# Patient Record
Sex: MALE | Race: WHITE
[De-identification: names, ages, dates, MRNs, and addresses within clinical notes are randomized per-mention and may not be internally consistent; named-entity substitution may affect disease eponyms.]

---

## 2019-06-20 NOTE — PDOC1
History and Physical


Date of Admission


Date of Admission


DATE: 6/20/19 


TIME: 10:58





Identification/Chief Complaint


Chief Complaint


 seen in er, 43  year old  with history of high cholesterol, 

hypertension, not on any medications, smoking, who presents today complaining of

2/10 sharp substernal chest pain nonradiating in nature that began 2 days ago 

and has been going on intermittently since then, he states he has had episodes 

of shortness of breath and lightheadedness with the chest pain. Patient denies 

anything exacerbating or relieving the pain. Denies pain radiating





 woke up and felt like he was a  dizzy but not bad enough that he was going to 

pass out.





Past Medical History


Past Medical History


high cholesterol, hypertension





Family History


Family History:  High Cholestrol, Hypertension





Social History


Smoke:  <1 pack per day


ALCOHOL:  occassional


Drugs:  None





Current Problem List


Problem List


Problems


Medical Problems:


(1) Smoking addiction


Status: Acute  











Current Medications


Current Medications





Current Medications


Aspirin (Jose Aspirin) 325 mg 1X  ONCE PO  Last administered on 6/20/19at 

09:54;  Start 6/20/19 at 09:00;  Stop 6/20/19 at 09:06;  Status DC


Nitroglycerin (Nitrostat) 0.4 mg PRN Q5MIN  PRN SL CP RATING > 1/10 Last 

administered on 6/20/19at 09:55;  Start 6/20/19 at 09:00;  Stop 6/21/19 at 08:59


Morphine Sulfate (Morphine Sulfate) 2 mg PRN Q15MIN  PRN IV/SQ PAIN GREATER THAN

3/10 Last administered on 6/20/19at 09:56;  Start 6/20/19 at 09:00;  Stop 

6/21/19 at 08:59


Clonidine HCl (Catapres) 0.1 mg 1X  ONCE PO  Last administered on 6/20/19at 

10:18;  Start 6/20/19 at 09:45;  Stop 6/20/19 at 09:48;  Status DC


Labetalol HCl (Normodyne Iv Push) 10 mg 1X  ONCE IVP ;  Start 6/20/19 at 10:45; 

Stop 6/20/19 at 10:46;  Status DC


Hydralazine HCl (Apresoline Inj) 10 mg 1X  ONCE IVP ;  Start 6/20/19 at 11:00;  

Stop 6/20/19 at 11:01;  Status UNV





Allergies


Allergies:  


Coded Allergies:  


     Sulfa (Sulfonamide Antibiotics) (Verified  Adverse Reaction, Severe, "my 

kidneys shut down", 6/20/19)


     Tetanus Vaccines and Toxoid (Verified  Adverse Reaction, Intermediate, 

fever, 6/20/19)





ROS


Review of System





Review of Systems


Review of Systems





Constitutional: Denies fever or chills []


Eyes: Denies change in visual acuity, redness, or eye pain []


HENT: Denies nasal congestion or sore throat []


Respiratory: Denies cough or shortness of breath []


Cardiovascular: Reports chest pain, tingling left arm, not jaw


GI: Denies abdominal pain, nausea, vomiting, bloody stools or diarrhea []


: Denies dysuria or hematuria []


Musculoskeletal: Denies back pain or joint pain []


Integument: Denies rash or skin lesions []


Neurologic: Denies headache, focal weakness or sensory changes []








14 pt  systems were reviewed and found to be within normal limits, except as 

documented


PSYCHOLOGICAL ROS:  No: Anxiety, Behavioral Disorder, Concentration difficultie,

 Decreased libido, Depression, Disorientation, Hallucinations, Hostility, 

Irritablity, Memory difficulties, Mood Swings, Obsessive thoughts, Physical 

abuse, Sexual abuse, Sleep disturbances, Suicidal ideation, Other


Cardiovascular:  yes Chest Pain


Neurological:  Yes Dizziness





Physical Exam


Physical Exam





Physical Exam


Physical Exam





Constitutional: Well developed, well nourished, no acute distress, non-toxic 

appearance. []


HENT: Normocephalic, atraumatic, bilateral external ears normal, oropharynx 

moist, no oral exudates, nose normal. []


Eyes: PERRLA, EOMI, conjunctiva normal, no discharge. [] 


Neck: Normal range of motion, no tenderness, supple, no stridor. [] 


Cardiovascular:Heart rate regular rhythm, no murmur []


Lungs & Thorax:  Bilateral breath sounds clear to auscultation []


Abdomen: Bowel sounds normal, soft, no tenderness, no masses, no pulsatile 

masses. [] 


Skin: Warm, dry, no erythema, no rash. [] 


Back: No tenderness, no CVA tenderness. [] 


Extremities: No tenderness, no cyanosis, no clubbing, ROM intact, no edema. [] 


Neurologic: Alert and oriented X 3, normal motor function, normal sensory 

function, no focal deficits noted. []


Psychologic: Affect normal, judgement normal, mood normal. []


General:  Oriented X3, Cooperative, No acute distress


HEENT:  Atraumatic


Lungs:  Clear to auscultation


Heart:  RRR


Abdomen:  Normal bowel sounds, Soft, No tenderness


Rectal Exam:  not examined


Extremities:  No cyanosis


Neuro:  Normal speech, Strength at 5/5 X4 ext, Cranial nerves 3-12 NL


Psych/Mental Status:  Mental status NL, Mood NL





Vitals


Vitals





Vital Signs








  Date Time  Temp Pulse Resp B/P (MAP) Pulse Ox O2 Delivery O2 Flow Rate FiO2


 


6/20/19 10:55  90 16 182/107 (132) 97 Room Air  


 


6/20/19 08:52 98.1       





 98.1       











Labs


Labs





Laboratory Tests








Test


 6/20/19


09:45 6/20/19


09:50


 


Urine Collection Type Unknown  


 


Urine Color Yellow  


 


Urine Clarity Clear  


 


Urine pH 5.5  


 


Urine Specific Gravity 1.015  


 


Urine Protein


 Negative mg/dL


(NEG-TRACE) 





 


Urine Glucose (UA)


 Negative mg/dL


(NEG) 





 


Urine Ketones (Stick)


 Negative mg/dL


(NEG) 





 


Urine Blood Negative (NEG)  


 


Urine Nitrite Negative (NEG)  


 


Urine Bilirubin Negative (NEG)  


 


Urine Urobilinogen Dipstick


 0.2 mg/dL (0.2


mg/dL) 





 


Urine Leukocyte Esterase Negative (NEG)  


 


Urine RBC 0 /HPF (0-2)  


 


Urine WBC 0 /HPF (0-4)  


 


Urine Squamous Epithelial


Cells Few /LPF 


 





 


Urine Bacteria 0 /HPF (0-FEW)  


 


Urine Opiates Screen Neg (NEG)  


 


Urine Methadone Screen Neg (NEG)  


 


Urine Barbiturates Neg (NEG)  


 


Urine Phencyclidine Screen Neg (NEG)  


 


Urine


Amphetamine/Methamphetamine Neg (NEG) 


 





 


Urine Benzodiazepines Screen Neg (NEG)  


 


Urine Cocaine Screen Neg (NEG)  


 


Urine Cannabinoids Screen Neg (NEG)  


 


Urine Ethyl Alcohol Neg (NEG)  


 


White Blood Count


 


 6.7 x10^3/uL


(4.0-11.0)


 


Red Blood Count


 


 5.11 x10^6/uL


(4.30-5.70)


 


Hemoglobin


 


 17.0 g/dL


(13.0-17.5)


 


Hematocrit


 


 48.8 %


(39.0-53.0)


 


Mean Corpuscular Volume  95 fL () 


 


Mean Corpuscular Hemoglobin  33 pg (25-35) 


 


Mean Corpuscular Hemoglobin


Concent 


 35 g/dL


(31-37)


 


Red Cell Distribution Width


 


 13.5 %


(11.5-14.5)


 


Platelet Count


 


 189 x10^3/uL


(140-400)


 


Neutrophils (%) (Auto)  67 % (31-73) 


 


Lymphocytes (%) (Auto)  23 % (24-48) 


 


Monocytes (%) (Auto)  7 % (0-9) 


 


Eosinophils (%) (Auto)  2 % (0-3) 


 


Basophils (%) (Auto)  1 % (0-3) 


 


Neutrophils # (Auto)


 


 4.5 x10^3uL


(1.8-7.7)


 


Lymphocytes # (Auto)


 


 1.6 x10^3/uL


(1.0-4.8)


 


Monocytes # (Auto)


 


 0.5 x10^3/uL


(0.0-1.1)


 


Eosinophils # (Auto)


 


 0.1 x10^3/uL


(0.0-0.7)


 


Basophils # (Auto)


 


 0.0 x10^3/uL


(0.0-0.2)


 


Prothrombin Time


 


 12.4 SEC


(11.7-14.0)


 


Prothromb Time International


Ratio 


 1.0 (0.8-1.1) 





 


Activated Partial


Thromboplast Time 


 26 SEC (24-38) 





 


D-Dimer (Danielle)


 


 < 0.27


ug/mlFEU


 


Sodium Level


 


 139 mmol/L


(136-145)


 


Potassium Level


 


 4.6 mmol/L


(3.5-5.1)


 


Chloride Level


 


 102 mmol/L


()


 


Carbon Dioxide Level


 


 26 mmol/L


(21-32)


 


Anion Gap  11 (6-14) 


 


Blood Urea Nitrogen


 


 17 mg/dL


(8-26)


 


Creatinine


 


 1.1 mg/dL


(0.7-1.3)


 


Estimated GFR


(Cockcroft-Gault) 


 72.4 





 


Glucose Level


 


 121 mg/dL


(70-99)


 


Calcium Level


 


 9.9 mg/dL


(8.5-10.1)


 


Magnesium Level


 


 1.9 mg/dL


(1.8-2.4)


 


Creatine Kinase


 


 119 U/L


()


 


Creatine Kinase MB (Mass)


 


 1.4 ng/mL


(0.0-3.6)


 


Creatine Kinase MB Relative


Index 


 1.2 % (0-4) 





 


Troponin I Quantitative


 


 < 0.017 ng/mL


(0.000-0.055)


 


NT-Pro-B-Type Natriuretic


Peptide 


 14 pg/mL


(0-124)


 


Thyroid Stimulating Hormone


(TSH) 


 0.916 uIU/mL


(0.358-3.74)








Laboratory Tests








Test


 6/20/19


09:45 6/20/19


09:50


 


Urine Collection Type Unknown  


 


Urine Color Yellow  


 


Urine Clarity Clear  


 


Urine pH 5.5  


 


Urine Specific Gravity 1.015  


 


Urine Protein


 Negative mg/dL


(NEG-TRACE) 





 


Urine Glucose (UA)


 Negative mg/dL


(NEG) 





 


Urine Ketones (Stick)


 Negative mg/dL


(NEG) 





 


Urine Blood Negative (NEG)  


 


Urine Nitrite Negative (NEG)  


 


Urine Bilirubin Negative (NEG)  


 


Urine Urobilinogen Dipstick


 0.2 mg/dL (0.2


mg/dL) 





 


Urine Leukocyte Esterase Negative (NEG)  


 


Urine RBC 0 /HPF (0-2)  


 


Urine WBC 0 /HPF (0-4)  


 


Urine Squamous Epithelial


Cells Few /LPF 


 





 


Urine Bacteria 0 /HPF (0-FEW)  


 


Urine Opiates Screen Neg (NEG)  


 


Urine Methadone Screen Neg (NEG)  


 


Urine Barbiturates Neg (NEG)  


 


Urine Phencyclidine Screen Neg (NEG)  


 


Urine


Amphetamine/Methamphetamine Neg (NEG) 


 





 


Urine Benzodiazepines Screen Neg (NEG)  


 


Urine Cocaine Screen Neg (NEG)  


 


Urine Cannabinoids Screen Neg (NEG)  


 


Urine Ethyl Alcohol Neg (NEG)  


 


White Blood Count


 


 6.7 x10^3/uL


(4.0-11.0)


 


Red Blood Count


 


 5.11 x10^6/uL


(4.30-5.70)


 


Hemoglobin


 


 17.0 g/dL


(13.0-17.5)


 


Hematocrit


 


 48.8 %


(39.0-53.0)


 


Mean Corpuscular Volume  95 fL () 


 


Mean Corpuscular Hemoglobin  33 pg (25-35) 


 


Mean Corpuscular Hemoglobin


Concent 


 35 g/dL


(31-37)


 


Red Cell Distribution Width


 


 13.5 %


(11.5-14.5)


 


Platelet Count


 


 189 x10^3/uL


(140-400)


 


Neutrophils (%) (Auto)  67 % (31-73) 


 


Lymphocytes (%) (Auto)  23 % (24-48) 


 


Monocytes (%) (Auto)  7 % (0-9) 


 


Eosinophils (%) (Auto)  2 % (0-3) 


 


Basophils (%) (Auto)  1 % (0-3) 


 


Neutrophils # (Auto)


 


 4.5 x10^3uL


(1.8-7.7)


 


Lymphocytes # (Auto)


 


 1.6 x10^3/uL


(1.0-4.8)


 


Monocytes # (Auto)


 


 0.5 x10^3/uL


(0.0-1.1)


 


Eosinophils # (Auto)


 


 0.1 x10^3/uL


(0.0-0.7)


 


Basophils # (Auto)


 


 0.0 x10^3/uL


(0.0-0.2)


 


Prothrombin Time


 


 12.4 SEC


(11.7-14.0)


 


Prothromb Time International


Ratio 


 1.0 (0.8-1.1) 





 


Activated Partial


Thromboplast Time 


 26 SEC (24-38) 





 


D-Dimer (Danielle)


 


 < 0.27


ug/mlFEU


 


Sodium Level


 


 139 mmol/L


(136-145)


 


Potassium Level


 


 4.6 mmol/L


(3.5-5.1)


 


Chloride Level


 


 102 mmol/L


()


 


Carbon Dioxide Level


 


 26 mmol/L


(21-32)


 


Anion Gap  11 (6-14) 


 


Blood Urea Nitrogen


 


 17 mg/dL


(8-26)


 


Creatinine


 


 1.1 mg/dL


(0.7-1.3)


 


Estimated GFR


(Cockcroft-Gault) 


 72.4 





 


Glucose Level


 


 121 mg/dL


(70-99)


 


Calcium Level


 


 9.9 mg/dL


(8.5-10.1)


 


Magnesium Level


 


 1.9 mg/dL


(1.8-2.4)


 


Creatine Kinase


 


 119 U/L


()


 


Creatine Kinase MB (Mass)


 


 1.4 ng/mL


(0.0-3.6)


 


Creatine Kinase MB Relative


Index 


 1.2 % (0-4) 





 


Troponin I Quantitative


 


 < 0.017 ng/mL


(0.000-0.055)


 


NT-Pro-B-Type Natriuretic


Peptide 


 14 pg/mL


(0-124)


 


Thyroid Stimulating Hormone


(TSH) 


 0.916 uIU/mL


(0.358-3.74)











Images


Images





CT of the head without contrast, 6/20/2019:


 


HISTORY: Lightheadedness


 


The ventricles are within normal limits in size. There is no shift of the 


midline structures. There is no evidence of acute intracranial hemorrhage 


or mass effect.


 


IMPRESSION: No acute intracranial abnormality is detected.


 


 


 


RS Compliance Statement:


 


One or more of the following individualized dose reduction techniques were


utilized for this examination:


1. Automated exposure control


2. Adjustment of the mA and/or kV according to patient size


3. Use of iterative reconstruction technique


 


Electronically signed by: Rick Moritz, MD (6/20/2019 9:38 AM) [facility]











VTE Prophylaxis Ordered


VTE Prophylaxis Devices:  Yes


VTE Pharmacological Prophylaxi:  Yes





Assessment/Plan


Assessment/Plan


impression





1. chest pain, at high risk for CAD


2. tobacco abuse


3. uncontrolled htn


 











PLAN





ADMIT TO CVC


Cardiology consult


echo


lipids, trend troponin


Smoking cessation, 


DASH diet


lisinopril/chlorthalidone


prn iv hydralazine





74 min pt exam, chart review> 50% of time spent with exam, chart review, pt care

 coordination, disease education











QUEENIE MOONEY MD          Jun 20, 2019 10:58

## 2019-06-20 NOTE — CARD
MR#: [id number]

Account#: [id number]

Accession#: [id number]

Date of Study: 06/20/2019

Ordering Physician: PIPPA ARVIZU, 

Referring Physician: QUEENIE MOONEY, 

Tech: Evette Stallings





--------------- APPROVED REPORT --------------





EXAM: Two-dimensional and M-mode echocardiogram with Doppler and color Doppler.



Other Information 

Quality : AverageHR: 91bpm



INDICATION

Hypertension/HCVD



RISK FACTORS

Hyperlipidemia

Smoking 



2D DIMENSIONS 

RVDd2.3 (2.9-3.5cm)Left Atrium(2D)3.2 (1.6-4.0cm)

IVSd0.9 (0.7-1.1cm)Aortic Root(2D)3.8 (2.0-3.7cm)

LVDd4.9 (3.9-5.9cm)LVOT Diameter2.3 (1.8-2.4cm)

PWd1.1 (0.7-1.1cm)LVDs3.0 (2.5-4.0cm)

FS (%) 39.8 %SV80.7 ml

LVEF(%)70.2 (>50%)



Aortic Valve

AoV Peak Pro.123.2cm/sAoV VTI18.8cm

AO Peak GR.6.1mmHgLVOT Peak Pro.100.1cm/s

LVOT  VTI 16.35cmAO Mean GR.3mmHg

ANGELA (VMAX)2.14oo6NES   (VTI)3.70cm2



Mitral Valve

MV E Jkuzargb54.3cm/sMV DECEL [id number]

MV A Texeqwhb68.2cm/sMV OAK88zy

E/A  Ratio0.9MVA (PHT)4.16cm2



TDI

E/Lateral E'7.1E/Medial E'10.0



Pulmonary Valve

PV Peak Fkzzmavy022.6cm/sPV Peak Grad.6mmHg



Tricuspid Valve

RAP [id number]



Pulmonary Vein

S1 Pjbpyhqs08.5cm/sD2 Jxlvwltm41.9cm/s

PVa [id number]



 LEFT VENTRICLE 

The left ventricle is normal size. There is borderline concentric left ventricular hypertrophy. The l
eft ventricular systolic function is normal and the ejection fraction is within normal range. The Eje
ction Fraction is >55%. There is normal LV segmental wall motion. Transmitral Doppler flow pattern is
 Grade I-abnormal relaxation pattern.



 RIGHT VENTRICLE 

The right ventricle is normal size. There is normal right ventricular wall thickness. The right ventr
icular systolic function is normal.



 ATRIA 

The left atrium size is normal. The right atrium size is normal. Interatrial septal thickening is not
ed.



 AORTIC VALVE 

The aortic valve is normal in structure and function. Doppler and Color Flow revealed no significant 
aortic regurgitation. There is no significant aortic valvular stenosis.



 MITRAL VALVE 

The mitral valve is normal in structure and function. There is no evidence of mitral valve prolapse. 
There is no mitral valve stenosis. Doppler and Color Flow revealed no mitral valve regurgitation note
d.



 TRICUSPID VALVE 

The tricuspid valve is normal in structure and function. Doppler and Color Flow revealed trace tricus
pid regurgitation with an estimated PAP of 26 mmHg. There is no tricuspid valve stenosis.



 PULMONIC VALVE 

The pulmonic valve is not well visualized. Doppler and Color Flow revealed no pulmonic valvular regur
gitation.



 GREAT VESSELS 

The aortic root is normal in size. The IVC is normal in size and collapses >50% with inspiration.



 PERICARDIAL EFFUSION 

There is no evidence of significant pericardial effusion.



Critical Notification

Critical Value: No



<Conclusion>

The left ventricular systolic function is normal and the ejection fraction is within normal range. Th
e Ejection Fraction is >55%.

There is normal LV segmental wall motion.



Signed by : Robert Wetzel, 

Electronically Approved : 06/20/2019 16:12:12

## 2019-06-20 NOTE — EKG
[facility]

              [address] [place] [address]

Test Date:    2019               Test Time:    08:55:37

Pat Name:     AMANDA ARRINGTON          Department:   

Patient ID:   [id number]           Room:          

Gender:       M                        Technician:   

:          [id number]               Requested By: AURELIA DEXTER

Order Number: [id number]           Reading MD:     

                                 Measurements

Intervals                              Axis          

Rate:         96                       P:            49

TX:           152                      QRS:          80

QRSD:         88                       T:            66

QT:           348                                    

QTc:          446                                    

                           Interpretive Statements

SINUS RHYTHM

OTHERWISE NORMAL ECG

RI6.01          Unconfirmed report

No previous ECG available for comparison

## 2019-06-20 NOTE — RAD
CT of the head without contrast, 6/20/2019:

 

HISTORY: Lightheadedness

 

The ventricles are within normal limits in size. There is no shift of the 

midline structures. There is no evidence of acute intracranial hemorrhage 

or mass effect.

 

IMPRESSION: No acute intracranial abnormality is detected.

 

 

 

RS Compliance Statement:

 

One or more of the following individualized dose reduction techniques were

utilized for this examination:

1. Automated exposure control

2. Adjustment of the mA and/or kV according to patient size

3. Use of iterative reconstruction technique

 

Electronically signed by: Rick Moritz, MD (6/20/2019 9:38 AM) [facility]

## 2019-06-20 NOTE — PHYS DOC
Adult General


Chief Complaint


Chief Complaint:  CHEST PAIN





HPI


HPI





Patient is a 43  year old  with history of high cholesterol, 

hypertension, not on any medications, smoking, who presents today complaining of

2/10 sharp substernal chest pain nonradiating in nature that began 2 days ago 

and has been going on intermittently since then, he states he has had episodes 

of shortness of breath and lightheadedness with the chest pain. Patient denies 

anything exacerbating or relieving the pain. Denies pain radiating.  Patient 

denies taking anything specifically to relieve the pain. He states he was on his

way to see Dr. Goldman for the first time and decided to come to the ED to be 

evaluated.





Review of Systems


Review of Systems





Constitutional: Denies fever or chills []


Eyes: Denies change in visual acuity, redness, or eye pain []


HENT: Denies nasal congestion or sore throat []


Respiratory: Denies cough or shortness of breath []


Cardiovascular: Reports chest pain


GI: Denies abdominal pain, nausea, vomiting, bloody stools or diarrhea []


: Denies dysuria or hematuria []


Musculoskeletal: Denies back pain or joint pain []


Integument: Denies rash or skin lesions []


Neurologic: Denies headache, focal weakness or sensory changes []








All other systems were reviewed and found to be within normal limits, except as 

documented in this note.





Current Medications


Current Medications





Current Medications








 Medications


  (Trade)  Dose


 Ordered  Sig/Tasha  Start Time


 Stop Time Status Last Admin


Dose Admin


 


 Aspirin


  (Jose Aspirin)  325 mg  1X  ONCE  6/20/19 09:00


 6/20/19 09:06 DC 6/20/19 09:54


325 MG


 


 Clonidine HCl


  (Catapres)  0.1 mg  1X  ONCE  6/20/19 09:45


 6/20/19 09:48 DC 6/20/19 10:18


0.1 MG


 


 Hydralazine HCl


  (Apresoline Inj)  10 mg  1X  ONCE  6/20/19 11:00


 6/20/19 11:01 DC  





 


 Labetalol HCl


  (Normodyne Iv


 Push)  10 mg  1X  ONCE  6/20/19 10:45


 6/20/19 10:46 DC 6/20/19 10:59


10 MG


 


 Morphine Sulfate


  (Morphine


 Sulfate)  2 mg  PRN Q15MIN  PRN  6/20/19 09:00


 6/21/19 08:59  6/20/19 09:56


2 MG


 


 Nitroglycerin


  (Nitrostat)  0.4 mg  PRN Q5MIN  PRN  6/20/19 09:00


 6/21/19 08:59  6/20/19 09:55


0.4 MG











Allergies


Allergies





Allergies








Coded Allergies Type Severity Reaction Last Updated Verified


 


  Sulfa (Sulfonamide Antibiotics) Adverse Reaction Severe "my kidneys shut down"

 6/20/19 Yes


 


  Tetanus Vaccines and Toxoid Adverse Reaction Intermediate fever 6/20/19 Yes











Physical Exam


Physical Exam





Constitutional: Well developed, well nourished, no acute distress, non-toxic 

appearance. []


HENT: Normocephalic, atraumatic, bilateral external ears normal, oropharynx 

moist, no oral exudates, nose normal. []


Eyes: PERRLA, EOMI, conjunctiva normal, no discharge. [] 


Neck: Normal range of motion, no tenderness, supple, no stridor. [] 


Cardiovascular:Heart rate regular rhythm, no murmur []


Lungs & Thorax:  Bilateral breath sounds clear to auscultation []


Abdomen: Bowel sounds normal, soft, no tenderness, no masses, no pulsatile 

masses. [] 


Skin: Warm, dry, no erythema, no rash. [] 


Back: No tenderness, no CVA tenderness. [] 


Extremities: No tenderness, no cyanosis, no clubbing, ROM intact, no edema. [] 


Neurologic: Alert and oriented X 3, normal motor function, normal sensory 

function, no focal deficits noted. []


Psychologic: Affect normal, judgement normal, mood normal. []





Current Patient Data


Vital Signs





                                   Vital Signs








  Date Time  Temp Pulse Resp B/P (MAP) Pulse Ox O2 Delivery O2 Flow Rate FiO2


 


6/20/19 10:59  94  185/107    


 


6/20/19 10:55   16  97 Room Air  


 


6/20/19 08:52 98.1       





 98.1       








Lab Values





                                Laboratory Tests








Test


 6/20/19


09:45 6/20/19


09:50


 


Urine Collection Type Unknown   


 


Urine Color Yellow   


 


Urine Clarity Clear   


 


Urine pH 5.5   


 


Urine Specific Gravity 1.015   


 


Urine Protein


 Negative mg/dL


(NEG-TRACE) 





 


Urine Glucose (UA)


 Negative mg/dL


(NEG) 





 


Urine Ketones (Stick)


 Negative mg/dL


(NEG) 





 


Urine Blood


 Negative (NEG)


 





 


Urine Nitrite


 Negative (NEG)


 





 


Urine Bilirubin


 Negative (NEG)


 





 


Urine Urobilinogen Dipstick


 0.2 mg/dL (0.2


mg/dL) 





 


Urine Leukocyte Esterase


 Negative (NEG)


 





 


Urine RBC 0 /HPF (0-2)   


 


Urine WBC 0 /HPF (0-4)   


 


Urine Squamous Epithelial


Cells Few /LPF  


 





 


Urine Bacteria


 0 /HPF (0-FEW)


 





 


Urine Opiates Screen Neg (NEG)   


 


Urine Methadone Screen Neg (NEG)   


 


Urine Barbiturates Neg (NEG)   


 


Urine Phencyclidine Screen Neg (NEG)   


 


Urine


Amphetamine/Methamphetamine Neg (NEG)  


 





 


Urine Benzodiazepines Screen Neg (NEG)   


 


Urine Cocaine Screen Neg (NEG)   


 


Urine Cannabinoids Screen Neg (NEG)   


 


Urine Ethyl Alcohol Neg (NEG)   


 


White Blood Count


 


 6.7 x10^3/uL


(4.0-11.0)


 


Red Blood Count


 


 5.11 x10^6/uL


(4.30-5.70)


 


Hemoglobin


 


 17.0 g/dL


(13.0-17.5)


 


Hematocrit


 


 48.8 %


(39.0-53.0)


 


Mean Corpuscular Volume


 


 95 fL ()





 


Mean Corpuscular Hemoglobin  33 pg (25-35)  


 


Mean Corpuscular Hemoglobin


Concent 


 35 g/dL


(31-37)


 


Red Cell Distribution Width


 


 13.5 %


(11.5-14.5)


 


Platelet Count


 


 189 x10^3/uL


(140-400)


 


Neutrophils (%) (Auto)  67 % (31-73)  


 


Lymphocytes (%) (Auto)  23 % (24-48)  L


 


Monocytes (%) (Auto)  7 % (0-9)  


 


Eosinophils (%) (Auto)  2 % (0-3)  


 


Basophils (%) (Auto)  1 % (0-3)  


 


Neutrophils # (Auto)


 


 4.5 x10^3uL


(1.8-7.7)


 


Lymphocytes # (Auto)


 


 1.6 x10^3/uL


(1.0-4.8)


 


Monocytes # (Auto)


 


 0.5 x10^3/uL


(0.0-1.1)


 


Eosinophils # (Auto)


 


 0.1 x10^3/uL


(0.0-0.7)


 


Basophils # (Auto)


 


 0.0 x10^3/uL


(0.0-0.2)


 


Prothrombin Time


 


 12.4 SEC


(11.7-14.0)


 


Prothrombin Time INR  1.0 (0.8-1.1)  


 


PTT


 


 26 SEC (24-38)





 


D-Dimer (Danielle)


 


 < 0.27


ug/mlFEU


 


Sodium Level


 


 139 mmol/L


(136-145)


 


Potassium Level


 


 4.6 mmol/L


(3.5-5.1)


 


Chloride Level


 


 102 mmol/L


()


 


Carbon Dioxide Level


 


 26 mmol/L


(21-32)


 


Anion Gap  11 (6-14)  


 


Blood Urea Nitrogen


 


 17 mg/dL


(8-26)


 


Creatinine


 


 1.1 mg/dL


(0.7-1.3)


 


Estimated GFR


(Cockcroft-Gault) 


 72.4  





 


Glucose Level


 


 121 mg/dL


(70-99)  H


 


Calcium Level


 


 9.9 mg/dL


(8.5-10.1)


 


Magnesium Level


 


 1.9 mg/dL


(1.8-2.4)


 


Creatine Kinase


 


 119 U/L


()


 


Creatine Kinase MB (Mass)


 


 1.4 ng/mL


(0.0-3.6)


 


Creatine Kinase MB Relative


Index 


 1.2 % (0-4)  





 


Troponin I Quantitative


 


 < 0.017 ng/mL


(0.000-0.055)


 


NT-Pro-B-Type Natriuretic


Peptide 


 14 pg/mL


(0-124)


 


Thyroid Stimulating Hormone


(TSH) 


 0.916 uIU/mL


(0.358-3.74)





                                Laboratory Tests


6/20/19 09:50








                                Laboratory Tests


6/20/19 09:50











EKG


EKG


08:56 Interpreted by Dr. Chan-sinus rhythm HR 96 no STEMI[]





Radiology/Procedures


Radiology/Procedures


[]PROCEDURE: CT HEAD WO CONTRAST





CT of the head without contrast, 6/20/2019:


 


HISTORY: Lightheadedness


 


The ventricles are within normal limits in size. There is no shift of the 


midline structures. There is no evidence of acute intracranial hemorrhage 


or mass effect.


 


IMPRESSION: No acute intracranial abnormality is detected.


 


 


 


RS Compliance Statement:


 


One or more of the following individualized dose reduction techniques were


utilized for this examination:


1. Automated exposure control


2. Adjustment of the mA and/or kV according to patient size


3. Use of iterative reconstruction technique


 


Electronically signed by: Rick Moritz, MD (6/20/2019 9:38 AM) [facility]














DICTATED and SIGNED BY:     MORITZ,RICK S MD


DATE:     06/20/19 0938


PROCEDURE: PORTABLE CHEST 1V





PORTABLE CHEST 1V


 


History: Midsternal chest pain for 2 days..


 


The heart size is not enlarged. No evidence of pneumothorax. No pleural 


effusion. No evidence of infiltrate. Bones appear intact.


 


IMPRESSION: No acute infiltrate.


 


Electronically signed by: Juancho Vallejo MD (6/20/2019 9:14 AM) [facility]














DICTATED and SIGNED BY:     JUANCHO VALLEJO MD


DATE:     06/20/19 0914





Course & Med Decision Making


Course & Med Decision Making


Pertinent Labs and Imaging studies reviewed. (See chart for details)





This is a 43-year-old male patient presented to the ED today complaining of sub

sternal chest pain, symptoms have been going on for 2 days with SOA and 

lightheadedness.





Heart Score-3








EKG is negative, troponin is normal, chest x-ray is negative, CT of the head is 

negative. CBC with no acute findings, BMP with glucose of 127, d-dimer is normal

 Patient's blood pressure was 203/113 on arrival to the ED with a heart rate in 

the 90s. We gave him clonidine, blood pressure still in the 190s over low 100s. 

Ordered labetalol.





Consulted with Dr. Burrows who accepted patient for admission. He also 

requested hydralazine





Page placed for Cardiology with routine consult in the computer





Dragon Disclaimer


Dragon Disclaimer


This electronic medical record was generated, in whole or in part, using a voice

 recognition dictation system.





Departure


Departure


Impression:  


   Primary Impression:  


   Smoking addiction


   Additional Impressions:  


   Chest pain


   Accelerated hypertension


Disposition:  09 ADMITTED AS INPATIENT


Condition:  STABLE





Problem Qualifiers








   Additional Impressions:  


   Chest pain


   Chest pain type:  unspecified  Qualified Codes:  R07.9 - Chest pain, 

   unspecified








AURELIA DEXTER APRN              Jun 20, 2019 09:00

## 2019-06-20 NOTE — RAD
PORTABLE CHEST 1V

 

History: Midsternal chest pain for 2 days..

 

The heart size is not enlarged. No evidence of pneumothorax. No pleural 

effusion. No evidence of infiltrate. Bones appear intact.

 

IMPRESSION: No acute infiltrate.

 

Electronically signed by: Juancho Vallejo MD (6/20/2019 9:14 AM) [facility]

## 2019-06-20 NOTE — PDOC2
PIPPA ARVIZU APRN 6/20/19 1257:


CARDIAC CONSULT


DATE OF CONSULT


Date of Consult


DATE: 6/20/19 


TIME: 12:30





REASON FOR CONSULT


Reason for Consult:


Chest pain





REFERRING PHYSICIAN


Referring Physician:


Milind





SOURCE


Source:  Chart review, Patient





HISTORY OF PRESENT ILLNESS


HISTORY OF PRESENT ILLNESS


This is a pleasant 44 yo male admitted for complains of dizziness and chest 

pain.  Reports that he was suppose to go to his PCP today but he was not feeling

well.  He woke up and felt like he was a little dizzy but not bad enough that he

was going to pass out.  Reports no nausea or vomiting and no issues with SOA but

felt some chest tightness but more of pounding.  No visual or auditory 

disturbances or any focal symptoms.  He was approx diagnosed with HTN 7 yrs ago 

but stopped taking his medication 5 yrs ago and could not remember when was the 

last time he took statins.  He does not take routine NSAIDs no issues with 

chronic pain and denies any exertional CP nor BAUTISTA.  Reports that he does not 

work and smoe tobacco but no recreational drugs.  He drinks beer twice a week 6 

pack in 1 sitting.  No recent falls injury, and no hx of CAD, VTE or CVA.





PAST MEDICAL HISTORY


Cardiovascular:  HTN, Hyperlipidemia


Pulmonary:  No pertinent hx


CENTRAL NERVOUS SYSTEM:  Other (No pertinent history)


GI:  No pertinent hx


Heme/Onc:  No pertinent hx


Hepatobiliary:  No pertinent hx


Psych:  No pertinent hx


Musculoskeletal:  Osteoarthritis


Rheumatologic:  No pertinent hx


Infectious disease:  No pertinent hx


ENT:  No pertinent hx


Renal/:  No pertinent hx


Endocrine:  No pertinent hx


Dermatology:  No pertinent hx





PAST SURGICAL HISTORY


Past Surgical History:  No pertinent history





FAMILY HISTORY


Family History:  Coronary Artery Disease (father at 49), Heart Disease (brother 

with CHF)





SOCIAL HISTORY


Smoke:  1 pack per day (>20 yrs)


ALCOHOL:  occassional


Drugs:  None


Lives:  with Family





CURRENT MEDICATIONS


CURRENT MEDICATIONS





Current Medications








 Medications


  (Trade)  Dose


 Ordered  Sig/Tasha


 Route


 PRN Reason  Start Time


 Stop Time Status Last Admin


Dose Admin


 


 Aspirin


  (Jose Aspirin)  325 mg  1X  ONCE


 PO


   6/20/19 09:00


 6/20/19 09:06 DC 6/20/19 09:54





 


 Nitroglycerin


  (Nitrostat)  0.4 mg  PRN Q5MIN  PRN


 SL


 CP RATING > 1/10  6/20/19 09:00


 6/20/19 11:17 DC 6/20/19 09:55





 


 Morphine Sulfate


  (Morphine


 Sulfate)  2 mg  PRN Q15MIN  PRN


 IV/SQ


 PAIN GREATER THAN 3/10  6/20/19 09:00


 6/20/19 11:17 DC 6/20/19 09:56





 


 Clonidine HCl


  (Catapres)  0.1 mg  1X  ONCE


 PO


   6/20/19 09:45


 6/20/19 09:48 DC 6/20/19 10:18





 


 Labetalol HCl


  (Normodyne Iv


 Push)  10 mg  1X  ONCE


 IVP


   6/20/19 10:45


 6/20/19 10:46 DC 6/20/19 10:59














ALLERGIES


ALLERGIES:  


Coded Allergies:  


     Sulfa (Sulfonamide Antibiotics) (Verified  Adverse Reaction, Severe, "my 

kidneys shut down", 6/20/19)


     Tetanus Vaccines and Toxoid (Verified  Adverse Reaction, Intermediate, 

fever, 6/20/19)





ROS


Review of System


14 point ROS evaluated with pertinent positives noted per HPI





PHYSICAL EXAM


General:  Alert, Oriented X3, Cooperative, No acute distress


HEENT:  Atraumatic, Mucous membr. moist/pink


Lungs:  Clear to auscultation, Normal air movement


Heart:  Regular rate (SR), Normal S1, Normal S2, Other (2/6 systolic murmur to 

LLS border)


Abdomen:  Soft, No tenderness


Extremities:  No cyanosis, No edema


Skin:  No breakdown, No significant lesion


Neuro:  Normal speech, Sensation intact


Psych/Mental Status:  Mental status NL, Mood NL


MUSCULOSKELETAL:  Osteoarthritic changes both hands





VITALS


VITALS





Vital Signs








  Date Time  Temp Pulse Resp B/P (MAP) Pulse Ox O2 Delivery O2 Flow Rate FiO2


 


6/20/19 11:59  84 18 160/105 (123) 97 Room Air  


 


6/20/19 08:52 98.1       





 98.1       











LABS


Lab:





Laboratory Tests








Test


 6/20/19


09:45 6/20/19


09:50


 


Urine Collection Type Unknown  


 


Urine Color Yellow  


 


Urine Clarity Clear  


 


Urine pH 5.5  


 


Urine Specific Gravity 1.015  


 


Urine Protein


 Negative mg/dL


(NEG-TRACE) 





 


Urine Glucose (UA)


 Negative mg/dL


(NEG) 





 


Urine Ketones (Stick)


 Negative mg/dL


(NEG) 





 


Urine Blood Negative (NEG)  


 


Urine Nitrite Negative (NEG)  


 


Urine Bilirubin Negative (NEG)  


 


Urine Urobilinogen Dipstick


 0.2 mg/dL (0.2


mg/dL) 





 


Urine Leukocyte Esterase Negative (NEG)  


 


Urine RBC 0 /HPF (0-2)  


 


Urine WBC 0 /HPF (0-4)  


 


Urine Squamous Epithelial


Cells Few /LPF 


 





 


Urine Bacteria 0 /HPF (0-FEW)  


 


Urine Opiates Screen Neg (NEG)  


 


Urine Methadone Screen Neg (NEG)  


 


Urine Barbiturates Neg (NEG)  


 


Urine Phencyclidine Screen Neg (NEG)  


 


Urine


Amphetamine/Methamphetamine Neg (NEG) 


 





 


Urine Benzodiazepines Screen Neg (NEG)  


 


Urine Cocaine Screen Neg (NEG)  


 


Urine Cannabinoids Screen Neg (NEG)  


 


Urine Ethyl Alcohol Neg (NEG)  


 


White Blood Count


 


 6.7 x10^3/uL


(4.0-11.0)


 


Red Blood Count


 


 5.11 x10^6/uL


(4.30-5.70)


 


Hemoglobin


 


 17.0 g/dL


(13.0-17.5)


 


Hematocrit


 


 48.8 %


(39.0-53.0)


 


Mean Corpuscular Volume  95 fL () 


 


Mean Corpuscular Hemoglobin  33 pg (25-35) 


 


Mean Corpuscular Hemoglobin


Concent 


 35 g/dL


(31-37)


 


Red Cell Distribution Width


 


 13.5 %


(11.5-14.5)


 


Platelet Count


 


 189 x10^3/uL


(140-400)


 


Neutrophils (%) (Auto)  67 % (31-73) 


 


Lymphocytes (%) (Auto)  23 % (24-48) 


 


Monocytes (%) (Auto)  7 % (0-9) 


 


Eosinophils (%) (Auto)  2 % (0-3) 


 


Basophils (%) (Auto)  1 % (0-3) 


 


Neutrophils # (Auto)


 


 4.5 x10^3uL


(1.8-7.7)


 


Lymphocytes # (Auto)


 


 1.6 x10^3/uL


(1.0-4.8)


 


Monocytes # (Auto)


 


 0.5 x10^3/uL


(0.0-1.1)


 


Eosinophils # (Auto)


 


 0.1 x10^3/uL


(0.0-0.7)


 


Basophils # (Auto)


 


 0.0 x10^3/uL


(0.0-0.2)


 


Prothrombin Time


 


 12.4 SEC


(11.7-14.0)


 


Prothromb Time International


Ratio 


 1.0 (0.8-1.1) 





 


Activated Partial


Thromboplast Time 


 26 SEC (24-38) 





 


D-Dimer (Danielle)


 


 < 0.27


ug/mlFEU


 


Sodium Level


 


 139 mmol/L


(136-145)


 


Potassium Level


 


 4.6 mmol/L


(3.5-5.1)


 


Chloride Level


 


 102 mmol/L


()


 


Carbon Dioxide Level


 


 26 mmol/L


(21-32)


 


Anion Gap  11 (6-14) 


 


Blood Urea Nitrogen


 


 17 mg/dL


(8-26)


 


Creatinine


 


 1.1 mg/dL


(0.7-1.3)


 


Estimated GFR


(Cockcroft-Gault) 


 72.4 





 


Glucose Level


 


 121 mg/dL


(70-99)


 


Calcium Level


 


 9.9 mg/dL


(8.5-10.1)


 


Magnesium Level


 


 1.9 mg/dL


(1.8-2.4)


 


Creatine Kinase


 


 119 U/L


()


 


Creatine Kinase MB (Mass)


 


 1.4 ng/mL


(0.0-3.6)


 


Creatine Kinase MB Relative


Index 


 1.2 % (0-4) 





 


Troponin I Quantitative


 


 < 0.017 ng/mL


(0.000-0.055)


 


NT-Pro-B-Type Natriuretic


Peptide 


 14 pg/mL


(0-124)


 


Thyroid Stimulating Hormone


(TSH) 


 0.916 uIU/mL


(0.358-3.74)











ASSESSMENT/PLAN


ASSESSMENT/PLAN


1. Accelerated HTN


2. Dizziness and chest tightness: possibly from uncontrolled HTN


3. HLP


4. Noncompliance: last med use for HTn was 5 yrs ago. 


5. Tobaccoism


6. Family hx of premature CAD





Recommendations


1. TTE, lipids, trend troponin


2. Smoking cessation, dietitian for DASH diet


3. Start on lisinopril/chlorthalidone


4. Follow up in 4 weeks if test are unremarkable and will plan for outpt stress 

test.





KALEY BARBOZA MD 6/21/19 0857:


CARDIAC CONSULT


ASSESSMENT/PLAN


ASSESSMENT/PLAN


Patient seen and examined 6/20/19. Agree with NP's assessment and plan.


Blood pressure better controlled since admission. 


2-D echo showed normal LV systolic function.


Importance of compliance with medications reemphasized.


Plan outpatient ischemic evaluation.


Thank you for your consultation.











PIPPA ARVIZU          Jun 20, 2019 12:57


KALEY BARBOZA MD           Jun 21, 2019 08:57

## 2019-06-21 VITALS
DIASTOLIC BLOOD PRESSURE: 91 MMHG | SYSTOLIC BLOOD PRESSURE: 144 MMHG | DIASTOLIC BLOOD PRESSURE: 91 MMHG | SYSTOLIC BLOOD PRESSURE: 144 MMHG

## 2019-06-21 NOTE — NUR
Discharge Note:



AMANDA ARRINGTON [facility]



Discharge instructions and discharge home medications reviewed with Patient and a copy 
given. All questions have been answered and understanding verbalized. 



The following instructions and handouts were given: CP, DASH diet, HTN, smoking cessations 
and tips for success



Discontinued lines and drains: Peripheral IV intact.



Patient discharged to Home or Self Care with Spouse via Wheelchair

## 2019-06-21 NOTE — PDOC
PIPPA ARVIZU APRN 6/21/19 1215:


CARDIO Progress Notes


Date and Time


Date of Service


6/21/2019


Time of Evaluation


1110





Subjective


Subjective:  No Chest Pain, No shortness of breath, No Palpitations





Vitals


Vitals





Vital Signs








  Date Time  Temp Pulse Resp B/P (MAP) Pulse Ox O2 Delivery O2 Flow Rate FiO2


 


6/21/19 11:00 97.7 77 16 144/91 (108) 95 Room Air  





 97.7       








Weight


Weight [ ]





Input and Output


Intake and Output











Intake and Output 


 


 6/21/19





 07:00


 


Intake Total 550 ml


 


Balance 550 ml


 


 


 


Intake Oral 550 ml


 


# Voids 3











Laboratory


Labs





Laboratory Tests








Test


 6/20/19


12:50 6/20/19


16:10 6/21/19


05:00


 


Troponin I Quantitative


 < 0.017 ng/mL


(0.000-0.055) < 0.017 ng/mL


(0.000-0.055) 





 


White Blood Count


 


 


 8.3 x10^3/uL


(4.0-11.0)


 


Red Blood Count


 


 


 4.81 x10^6/uL


(4.30-5.70)


 


Hemoglobin


 


 


 16.0 g/dL


(13.0-17.5)


 


Hematocrit


 


 


 46.2 %


(39.0-53.0)


 


Mean Corpuscular Volume   96 fL () 


 


Mean Corpuscular Hemoglobin   33 pg (25-35) 


 


Mean Corpuscular Hemoglobin


Concent 


 


 35 g/dL


(31-37)


 


Red Cell Distribution Width


 


 


 13.5 %


(11.5-14.5)


 


Platelet Count


 


 


 178 x10^3/uL


(140-400)


 


Neutrophils (%) (Auto)   47 % (31-73) 


 


Lymphocytes (%) (Auto)   41 % (24-48) 


 


Monocytes (%) (Auto)   9 % (0-9) 


 


Eosinophils (%) (Auto)   3 % (0-3) 


 


Basophils (%) (Auto)   1 % (0-3) 


 


Neutrophils # (Auto)


 


 


 3.9 x10^3uL


(1.8-7.7)


 


Lymphocytes # (Auto)


 


 


 3.4 x10^3/uL


(1.0-4.8)


 


Monocytes # (Auto)


 


 


 0.7 x10^3/uL


(0.0-1.1)


 


Eosinophils # (Auto)


 


 


 0.3 x10^3/uL


(0.0-0.7)


 


Basophils # (Auto)


 


 


 0.0 x10^3/uL


(0.0-0.2)


 


Sodium Level


 


 


 139 mmol/L


(136-145)


 


Potassium Level


 


 


 3.9 mmol/L


(3.5-5.1)


 


Chloride Level


 


 


 103 mmol/L


()


 


Carbon Dioxide Level


 


 


 25 mmol/L


(21-32)


 


Anion Gap   11 (6-14) 


 


Blood Urea Nitrogen


 


 


 16 mg/dL


(8-26)


 


Creatinine


 


 


 1.0 mg/dL


(0.7-1.3)


 


Estimated GFR


(Cockcroft-Gault) 


 


 80.8 





 


Glucose Level


 


 


 98 mg/dL


(70-99)


 


Calcium Level


 


 


 9.4 mg/dL


(8.5-10.1)


 


Triglycerides Level


 


 


 103 mg/dL


(0-150)


 


Cholesterol Level


 


 


 241 mg/dL


(0-200)


 


LDL Cholesterol, Calculated


 


 


 170 mg/dL


(0-100)


 


VLDL Cholesterol, Calculated


 


 


 21 mg/dL


(0-40)


 


Non-HDL Cholesterol Calculated


 


 


 191 mg/dL


(0-129)


 


HDL Cholesterol


 


 


 50 mg/dL


(40-60)


 


Cholesterol/HDL Ratio   4.8 











Physical Exam


HEENT:  Neck Supple W Full Motion


Chest:  Symmetric


LUNGS:  Clear to Auscultation


Heart:  S1S2, RRR (SR no ectopies)


Abdomen:  Soft N/T


Extremities:  No Edema, No Calf Tenderness


Neurology:  alert, oriented, follow commands





Assessment


Assessment


1. Accelerated HTN: controlled. EF and WM nml


2. Dizziness and chest tightness: possibly from uncontrolled HTN


3. HLP


4. Noncompliance: last med use for HTn was 5 yrs ago. 


5. Tobaccoism


6. Family hx of premature CAD





Recommendations


1. Treadmill MPI as an outpt


2. Smoking cessation, dietitian for DASH diet


3. Continue lisinopril/chlorthalidone and add statin. Consider ASA for primary 

prevention


4. Follow up in 4 weeks


5. Discussed adherence





KALEY BARBOZA MD 6/21/19 1730:


CARDIO Progress Notes


Assessment


Assessment


Patient seen and examined. Agree with NP's assessment and plan.


Blood pressure better controlled


Plan for ischemic evaluation with stress test as an outpatient











PIPPA ARVIZU          Jun 21, 2019 12:15


KALEY BARBOZA MD           Jun 21, 2019 17:30

## 2019-06-21 NOTE — PDOC3
Discharge Summary


Visit Information


Date of Admission:  Jun 20, 2019


Date of Discharge:  Jun 21, 2019


Admitting Diagnosis Comment:


1. Accelerated HTN


2. Dizziness and chest tightness: possibly from uncontrolled HTN


3. HLP


4. Noncompliance: last med use for HTn was 5 yrs ago. 


5. Tobaccoism


6. Family hx of premature CAD


Final Diagnosis


Problems


Medical Problems:


(1) Accelerated hypertension


Status: Acute  





(2) Chest pain


Status: Acute  





(3) Smoking addiction


Status: Acute  











Brief Hospital Course


Allergies





                                    Allergies








Coded Allergies Type Severity Reaction Last Updated Verified


 


  Sulfa (Sulfonamide Antibiotics) Adverse Reaction Severe "my kidneys shut down"

6/20/19 Yes


 


  Tetanus Vaccines and Toxoid Adverse Reaction Intermediate fever 6/20/19 Yes








Vital Signs





Vital Signs








  Date Time  Temp Pulse Resp B/P (MAP) Pulse Ox O2 Delivery O2 Flow Rate FiO2


 


6/21/19 09:29  64  145/87    


 


6/21/19 07:00 98.0  16  99 Room Air  





 98.0       








Lab Results





Laboratory Tests








Test


 6/20/19


09:45 6/20/19


09:50 6/20/19


12:50 6/20/19


16:10


 


Urine Collection Type Unknown    


 


Urine Color Yellow    


 


Urine Clarity Clear    


 


Urine pH 5.5    


 


Urine Specific Gravity 1.015    


 


Urine Protein


 Negative mg/dL


(NEG-TRACE) 


 


 





 


Urine Glucose (UA)


 Negative mg/dL


(NEG) 


 


 





 


Urine Ketones (Stick)


 Negative mg/dL


(NEG) 


 


 





 


Urine Blood Negative (NEG)    


 


Urine Nitrite Negative (NEG)    


 


Urine Bilirubin Negative (NEG)    


 


Urine Urobilinogen Dipstick


 0.2 mg/dL (0.2


mg/dL) 


 


 





 


Urine Leukocyte Esterase Negative (NEG)    


 


Urine RBC 0 /HPF (0-2)    


 


Urine WBC 0 /HPF (0-4)    


 


Urine Squamous Epithelial


Cells Few /LPF 


 


 


 





 


Urine Bacteria 0 /HPF (0-FEW)    


 


Urine Opiates Screen Neg (NEG)    


 


Urine Methadone Screen Neg (NEG)    


 


Urine Barbiturates Neg (NEG)    


 


Urine Phencyclidine Screen Neg (NEG)    


 


Urine


Amphetamine/Methamphetamine Neg (NEG) 


 


 


 





 


Urine Benzodiazepines Screen Neg (NEG)    


 


Urine Cocaine Screen Neg (NEG)    


 


Urine Cannabinoids Screen Neg (NEG)    


 


Urine Ethyl Alcohol Neg (NEG)    


 


White Blood Count


 


 6.7 x10^3/uL


(4.0-11.0) 


 





 


Red Blood Count


 


 5.11 x10^6/uL


(4.30-5.70) 


 





 


Hemoglobin


 


 17.0 g/dL


(13.0-17.5) 


 





 


Hematocrit


 


 48.8 %


(39.0-53.0) 


 





 


Mean Corpuscular Volume  95 fL ()   


 


Mean Corpuscular Hemoglobin  33 pg (25-35)   


 


Mean Corpuscular Hemoglobin


Concent 


 35 g/dL


(31-37) 


 





 


Red Cell Distribution Width


 


 13.5 %


(11.5-14.5) 


 





 


Platelet Count


 


 189 x10^3/uL


(140-400) 


 





 


Neutrophils (%) (Auto)  67 % (31-73)   


 


Lymphocytes (%) (Auto)  23 % (24-48)   


 


Monocytes (%) (Auto)  7 % (0-9)   


 


Eosinophils (%) (Auto)  2 % (0-3)   


 


Basophils (%) (Auto)  1 % (0-3)   


 


Neutrophils # (Auto)


 


 4.5 x10^3uL


(1.8-7.7) 


 





 


Lymphocytes # (Auto)


 


 1.6 x10^3/uL


(1.0-4.8) 


 





 


Monocytes # (Auto)


 


 0.5 x10^3/uL


(0.0-1.1) 


 





 


Eosinophils # (Auto)


 


 0.1 x10^3/uL


(0.0-0.7) 


 





 


Basophils # (Auto)


 


 0.0 x10^3/uL


(0.0-0.2) 


 





 


Prothrombin Time


 


 12.4 SEC


(11.7-14.0) 


 





 


Prothromb Time International


Ratio 


 1.0 (0.8-1.1) 


 


 





 


Activated Partial


Thromboplast Time 


 26 SEC (24-38) 


 


 





 


D-Dimer (Danielle)


 


 < 0.27


ug/mlFEU 


 





 


Sodium Level


 


 139 mmol/L


(136-145) 


 





 


Potassium Level


 


 4.6 mmol/L


(3.5-5.1) 


 





 


Chloride Level


 


 102 mmol/L


() 


 





 


Carbon Dioxide Level


 


 26 mmol/L


(21-32) 


 





 


Anion Gap  11 (6-14)   


 


Blood Urea Nitrogen


 


 17 mg/dL


(8-26) 


 





 


Creatinine


 


 1.1 mg/dL


(0.7-1.3) 


 





 


Estimated GFR


(Cockcroft-Gault) 


 72.4 


 


 





 


Glucose Level


 


 121 mg/dL


(70-99) 


 





 


Calcium Level


 


 9.9 mg/dL


(8.5-10.1) 


 





 


Magnesium Level


 


 1.9 mg/dL


(1.8-2.4) 


 





 


Creatine Kinase


 


 119 U/L


() 


 





 


Creatine Kinase MB (Mass)


 


 1.4 ng/mL


(0.0-3.6) 


 





 


Creatine Kinase MB Relative


Index 


 1.2 % (0-4) 


 


 





 


Troponin I Quantitative


 


 < 0.017 ng/mL


(0.000-0.055) < 0.017 ng/mL


(0.000-0.055) < 0.017 ng/mL


(0.000-0.055)


 


NT-Pro-B-Type Natriuretic


Peptide 


 14 pg/mL


(0-124) 


 





 


Thyroid Stimulating Hormone


(TSH) 


 0.916 uIU/mL


(0.358-3.74) 


 





 


Test


 6/21/19


05:00 


 


 





 


White Blood Count


 8.3 x10^3/uL


(4.0-11.0) 


 


 





 


Red Blood Count


 4.81 x10^6/uL


(4.30-5.70) 


 


 





 


Hemoglobin


 16.0 g/dL


(13.0-17.5) 


 


 





 


Hematocrit


 46.2 %


(39.0-53.0) 


 


 





 


Mean Corpuscular Volume 96 fL ()    


 


Mean Corpuscular Hemoglobin 33 pg (25-35)    


 


Mean Corpuscular Hemoglobin


Concent 35 g/dL


(31-37) 


 


 





 


Red Cell Distribution Width


 13.5 %


(11.5-14.5) 


 


 





 


Platelet Count


 178 x10^3/uL


(140-400) 


 


 





 


Neutrophils (%) (Auto) 47 % (31-73)    


 


Lymphocytes (%) (Auto) 41 % (24-48)    


 


Monocytes (%) (Auto) 9 % (0-9)    


 


Eosinophils (%) (Auto) 3 % (0-3)    


 


Basophils (%) (Auto) 1 % (0-3)    


 


Neutrophils # (Auto)


 3.9 x10^3uL


(1.8-7.7) 


 


 





 


Lymphocytes # (Auto)


 3.4 x10^3/uL


(1.0-4.8) 


 


 





 


Monocytes # (Auto)


 0.7 x10^3/uL


(0.0-1.1) 


 


 





 


Eosinophils # (Auto)


 0.3 x10^3/uL


(0.0-0.7) 


 


 





 


Basophils # (Auto)


 0.0 x10^3/uL


(0.0-0.2) 


 


 





 


Sodium Level


 139 mmol/L


(136-145) 


 


 





 


Potassium Level


 3.9 mmol/L


(3.5-5.1) 


 


 





 


Chloride Level


 103 mmol/L


() 


 


 





 


Carbon Dioxide Level


 25 mmol/L


(21-32) 


 


 





 


Anion Gap 11 (6-14)    


 


Blood Urea Nitrogen


 16 mg/dL


(8-26) 


 


 





 


Creatinine


 1.0 mg/dL


(0.7-1.3) 


 


 





 


Estimated GFR


(Cockcroft-Gault) 80.8 


 


 


 





 


Glucose Level


 98 mg/dL


(70-99) 


 


 





 


Calcium Level


 9.4 mg/dL


(8.5-10.1) 


 


 





 


Triglycerides Level


 103 mg/dL


(0-150) 


 


 





 


Cholesterol Level


 241 mg/dL


(0-200) 


 


 





 


LDL Cholesterol, Calculated


 170 mg/dL


(0-100) 


 


 





 


VLDL Cholesterol, Calculated


 21 mg/dL


(0-40) 


 


 





 


Non-HDL Cholesterol Calculated


 191 mg/dL


(0-129) 


 


 





 


HDL Cholesterol


 50 mg/dL


(40-60) 


 


 





 


Cholesterol/HDL Ratio 4.8    








Laboratory Tests








Test


 6/20/19


12:50 6/20/19


16:10 6/21/19


05:00


 


Troponin I Quantitative


 < 0.017 ng/mL


(0.000-0.055) < 0.017 ng/mL


(0.000-0.055) 





 


White Blood Count


 


 


 8.3 x10^3/uL


(4.0-11.0)


 


Red Blood Count


 


 


 4.81 x10^6/uL


(4.30-5.70)


 


Hemoglobin


 


 


 16.0 g/dL


(13.0-17.5)


 


Hematocrit


 


 


 46.2 %


(39.0-53.0)


 


Mean Corpuscular Volume   96 fL () 


 


Mean Corpuscular Hemoglobin   33 pg (25-35) 


 


Mean Corpuscular Hemoglobin


Concent 


 


 35 g/dL


(31-37)


 


Red Cell Distribution Width


 


 


 13.5 %


(11.5-14.5)


 


Platelet Count


 


 


 178 x10^3/uL


(140-400)


 


Neutrophils (%) (Auto)   47 % (31-73) 


 


Lymphocytes (%) (Auto)   41 % (24-48) 


 


Monocytes (%) (Auto)   9 % (0-9) 


 


Eosinophils (%) (Auto)   3 % (0-3) 


 


Basophils (%) (Auto)   1 % (0-3) 


 


Neutrophils # (Auto)


 


 


 3.9 x10^3uL


(1.8-7.7)


 


Lymphocytes # (Auto)


 


 


 3.4 x10^3/uL


(1.0-4.8)


 


Monocytes # (Auto)


 


 


 0.7 x10^3/uL


(0.0-1.1)


 


Eosinophils # (Auto)


 


 


 0.3 x10^3/uL


(0.0-0.7)


 


Basophils # (Auto)


 


 


 0.0 x10^3/uL


(0.0-0.2)


 


Sodium Level


 


 


 139 mmol/L


(136-145)


 


Potassium Level


 


 


 3.9 mmol/L


(3.5-5.1)


 


Chloride Level


 


 


 103 mmol/L


()


 


Carbon Dioxide Level


 


 


 25 mmol/L


(21-32)


 


Anion Gap   11 (6-14) 


 


Blood Urea Nitrogen


 


 


 16 mg/dL


(8-26)


 


Creatinine


 


 


 1.0 mg/dL


(0.7-1.3)


 


Estimated GFR


(Cockcroft-Gault) 


 


 80.8 





 


Glucose Level


 


 


 98 mg/dL


(70-99)


 


Calcium Level


 


 


 9.4 mg/dL


(8.5-10.1)


 


Triglycerides Level


 


 


 103 mg/dL


(0-150)


 


Cholesterol Level


 


 


 241 mg/dL


(0-200)


 


LDL Cholesterol, Calculated


 


 


 170 mg/dL


(0-100)


 


VLDL Cholesterol, Calculated


 


 


 21 mg/dL


(0-40)


 


Non-HDL Cholesterol Calculated


 


 


 191 mg/dL


(0-129)


 


HDL Cholesterol


 


 


 50 mg/dL


(40-60)


 


Cholesterol/HDL Ratio   4.8 








Brief Hospital Course


Mr. Urbina  is a 43 old white male who does not take any meds coming in because

of chest pain and very high blood pressure. Echo was normal but we needed to 

start some new home meds including lisinopril, HCTZ, statin, nitroglycerin 

sublingual. Did also advise aspirin over-the-counter


He's feeling much better, blood pressure good


Consults performed cardiology


Procedures performed echo, follow up cards in 4 weeks for blood pressure and 

possible outpatient MPI if warranted


Discussed with wife at bedside





dc < 30





Discharge Information


Condition at Discharge:  Improved, Stable


Follow Up:  Weeks (4 weeks  cards for OP MPI)


Disposition/Orders:  D/C to Home


Scheduled


Atorvastatin Calcium (Atorvastatin Calcium) 40 Mg Tablet, 40 MG PO QHS for hlp, 

#60


   Prescribed by: KERRIE CARRILLO on 6/21/19 0936


Chlorthalidone (Chlorthalidone **) 25 Mg Tablet, 25 MG PO DAILY for htn, #60


   Prescribed by: KERRIE CARRILLO on 6/21/19 0936


Lisinopril (Lisinopril) 10 Mg Tablet, 10 MG PO DAILY for htn, #60


   Prescribed by: KERRIE CARRILLO on 6/21/19 0936





Scheduled PRN


Nitroglycerin (Nitrostat) 0.4 Mg Tab.subl, 0.4 MG SL PRN Q5MIN PRN for CHEST 

PAIN, #60


   Prescribed by: KERRIE CARRILLO on 6/21/19 0936





Discontinued Medications


[no home medications]  , (Reported)


   Entered as Reported by: JERI STERLING on 6/20/19 1341


   Last Action: New Order on 6/20/19 1341 by KERRIE PIZARRO MD           Jun 21, 2019 10:38

## 2019-06-21 NOTE — NUR
SS following for discharge planning. SS reviewed pt chart. Discharge order on the chart. Pt 
is from home and is currently on room air.

## 2019-07-15 ENCOUNTER — HOSPITAL ENCOUNTER (OUTPATIENT)
Dept: HOSPITAL 61 - NM | Age: 44
Discharge: HOME | End: 2019-07-15
Attending: INTERNAL MEDICINE
Payer: COMMERCIAL

## 2019-07-15 DIAGNOSIS — F17.200: ICD-10-CM

## 2019-07-15 DIAGNOSIS — R07.9: Primary | ICD-10-CM

## 2019-07-15 PROCEDURE — 93017 CV STRESS TEST TRACING ONLY: CPT

## 2019-07-15 PROCEDURE — A9500 TC99M SESTAMIBI: HCPCS

## 2019-07-15 PROCEDURE — 96376 TX/PRO/DX INJ SAME DRUG ADON: CPT

## 2019-07-15 PROCEDURE — 78452 HT MUSCLE IMAGE SPECT MULT: CPT

## 2019-07-15 NOTE — RAD
MR#: [id number]

Account#: [id number]

Accession#: [id number]

Date of Study: 07/15/2019

Ordering Physician: KALEY BARBOZA 

Referring Physician: ROSIE LUKE Tech: FRANCES Brush, ARRCELESTINA (R) (N)





--------------- APPROVED REPORT --------------





Test Type:          Exercise

Stress Nurse/Tech: Monica Mcdaniel R.N.

Test Indications: chest pain

Cardiac History: smoker

Medications:     see ehr

Medical History: see ehr

Resting ECG:     sr

Resting Heart Rate: 71 bpm

Resting Blood Pressure: 136/74mmHg

Pretest Chest Pain: No chest pain



Nurse/Tech Notes

lungs cta, heart tones regular

Consent: The procedure was explained to the patient in lay terms. Informed consent was witnessed. Steve
eout was entered into [employer]. History and Stress Test performed by RT Jaun (R) (N)



Stress Symptoms

No chest pain or symptoms.



POST EXERCISE

Reason for Termination: Reached target heart rate

Target HR: Yes

Max HR: 169 bpm

96% of Maximum Predicted HR: 175 bpm

Exercise duration: 7:15 min:sec, 3 Stage

Exercise capacity: 10.0METs

Max Blood Pressure: 180/82mmHg

Blood Pressure response to exercise: Normal blood pressure response during stress.

Heart Rate response to exercise: normal

Chest Pain: No. 

Arrhythmia: No. 



INTERPRETATION

Stress EKG Conclusion: The baseline EKG showed a sinus rhythm with nonspecific ST-T wave changes.

The stress EKG showed further mild nonspecific ST T-wave changes that are not diagnostic of ischemia.


No EKG evidence of stress-induced ischemia.



Imaging Protocol

IMAGE PROTOCOL: Rest Tc-99m/stress Tc-99m 1 day



Rest:            Stress:         Viability:   

Radiopharm.Tc99m EulupjkbuXt99a Sestamibi

Dose11.7mCi            33mCi            

Img Date  07/15/2019      07/15/2019      

Inj-Img [id number]           60min.           



Rest Admin Site:IV - Right AntecubitalAdministrator:NIKUNJ Sullivan

Stress Admin Site: IV - Right AntecubitalAdministrator: RT Jaun (R)(N)



STRESS DATA

End Diast. Vol.71.0mlAv. Heart Rate99.0bpm

End Syst. Vol.17.0mlCO Index BSA0.0L/min

Myocardial Dsta644.0gEject. Nzqxxnrx38.0%



Stress Rates

Pk. Fill Rate4.73EDV/secLVtime Pk. Fill 185.81msec

Pk. Empty Rate7.64ESV/secLVtime Pk. Eject78.34msec

1/3 Pk. Fill1.50EDV/sec



Stress Scores

Regional WT2.00Summed WT14.00

Regional WM0.00Summed WM2.00



LV Perfusion

The stress scans showed no significant defects.

The rest scans showed no significant defects.

Nuclear imaging shows no reversible ischemia or infarct.



Wall Motion

Left ventricular systolic function is normal with no regional wall motion abnormalities and an ejecti
on fraction of greater than 70%.



LV Perf. Quant

17 Seg. SSS1.00

17 Seg. SRS4.00

17 Seg. SDS0.00

Stress Defect Extent (% LAD)0.00Rest Defect Extent (% LAD)0.00Rev. Defect Extent (% LAD)0.00

Stress Defect Extent (% LCX) 0.00Rest Defect Extent (% LCX)0.00Rev. Defect Extent (% LCX)0.00

Stress Defect Extent (% RCA)2.20Rest Defect Extent (% RCA)0.00Rev. Defect Extent (% RCA)0.00

Stress Defect Extent (% CINDY)0.90Rest Defect Extent (% CINDY)0.00Rev. Defect Extent (% CINDY)0.00



Conclusion

1. Good exercise tolerance.

2. No EKG evidence of stressed induced ischemia.

3. Nuclear imaging shows no reversible ischemia or infarct.

4. Left ventricular systolic function is normal with an ejection fraction of greater than 70%.

5. Low risk treadmill nuclear stress test.



Signed by : Armani Dominguez MD

Electronically Approved : 07/15/2019 14:09:16

## 2021-04-06 ENCOUNTER — HOSPITAL ENCOUNTER (OUTPATIENT)
Dept: HOSPITAL 61 - ECHO | Age: 46
End: 2021-04-06
Attending: INTERNAL MEDICINE
Payer: COMMERCIAL

## 2021-04-06 DIAGNOSIS — I11.9: Primary | ICD-10-CM

## 2021-04-06 PROCEDURE — 93306 TTE W/DOPPLER COMPLETE: CPT

## 2021-04-06 NOTE — CARD
MR#: [id number]

Account#: [id number]

Accession#: [id number]

Date of Study: 04/06/2021

Ordering Physician: KALEY BARBOZA, 

Referring Physician: KALEY BARBOZA 

Tech: Jolene Klein [facility]





--------------- APPROVED REPORT --------------





EXAM: Two-dimensional and M-mode echocardiogram with Doppler and color Doppler.



Other Information 

Quality : AverageHR: 79bpm

Rhythm : NSR



INDICATION

Hypertension/HCVD



RISK FACTORS

Hypertension 

Hyperlipidemia

Smoking 



2D DIMENSIONS 

RVDd3.2 (2.9-3.5cm)Left Atrium(2D)3.5 (1.6-4.0cm)

IVSd1.1 (0.7-1.1cm)Aortic Root(2D)4.1 (2.0-3.7cm)

LVDd4.6 (3.9-5.9cm)LVOT Diameter2.5 (1.8-2.4cm)

PWd1.1 (0.7-1.1cm)LVDs2.6 (2.5-4.0cm)

FS (%) 44.1 %SV75.2 ml



Aortic Valve

AoV Peak Pro.105.8cm/sAoV VTI21.8cm

AO Peak GR.4.5mmHgLVOT Peak Pro.102.6cm/s

AO Mean GR.2mmHgAVA (VMAX)4.66cm2



Mitral Valve

MV E Boustlqh20.5cm/sMV DECEL [id number]

MV A Euuqiigg52.4cm/sE/A  Ratio1.1



Pulmonary Valve

PV Peak Vhrczakn88.9cm/s



Tricuspid Valve

TR P. Xndqazae208lj/sTR Peak Gr.21mmHg



 LEFT VENTRICLE 

The left ventricle is normal size. There is borderline concentric left ventricular hypertrophy. The l
eft ventricular systolic function is normal and the ejection fraction is within normal range. Estimat
ed ejection fraction 55-60%. There is normal LV segmental wall motion. The left ventricular diastolic
 function and filling is normal for age.



 RIGHT VENTRICLE 

The right ventricle is normal size. There is normal right ventricular wall thickness. The right ventr
icular systolic function is normal.



 ATRIA 

The left atrium size is normal. The right atrium size is normal. The interatrial septum is intact wit
h no evidence for an atrial septal defect or patent foramen ovale as noted on 2-D or Doppler imaging.




 AORTIC VALVE 

The aortic valve is normal in structure and function. Doppler and Color Flow revealed no significant 
aortic regurgitation. There is no significant aortic valvular stenosis.



 MITRAL VALVE 

The mitral valve is normal in structure and function. There is no evidence of mitral valve prolapse. 
There is no mitral valve stenosis. Doppler and Color Flow revealed no mitral valve regurgitation note
d.



 TRICUSPID VALVE 

The tricuspid valve is normal in structure and function. Doppler and Color Flow revealed trace tricus
pid regurgitation. Estimated PAP 24 mmHg. There is no tricuspid valve stenosis.



 PULMONIC VALVE 

The pulmonary valve is normal in structure and function. Doppler and Color Flow revealed no pulmonic 
valvular regurgitation.



 GREAT VESSELS 

The aortic root is mildly enlarged. The IVC is normal in size and collapses >50% with inspiration.



 PERICARDIAL EFFUSION 

There is no evidence of significant pericardial effusion.



Critical Notification

Critical Value: No



<Conclusion>

The left ventricle is normal size.

The left ventricular systolic function is normal and the ejection fraction is within normal range. 

Estimated ejection fraction 55-60%.

There is borderline concentric left ventricular hypertrophy.

Doppler and Color Flow revealed no significant aortic regurgitation.

There is no significant aortic valvular stenosis.

Doppler and Color Flow revealed no mitral valve regurgitation noted.

Doppler and Color Flow revealed trace tricuspid regurgitation.  Estimated PAP 24 mmHg.

The aortic root is mildly enlarged.



Signed by : Armani Dominguez MD

Electronically Approved : 04/06/2021 17:30:39

## 2022-01-19 ENCOUNTER — HOSPITAL ENCOUNTER (EMERGENCY)
Dept: HOSPITAL 61 - ER | Age: 47
Discharge: HOME | End: 2022-01-19
Payer: COMMERCIAL

## 2022-01-19 VITALS — BODY MASS INDEX: 27.24 KG/M2 | WEIGHT: 190.26 LBS | HEIGHT: 70 IN

## 2022-01-19 VITALS — SYSTOLIC BLOOD PRESSURE: 177 MMHG | DIASTOLIC BLOOD PRESSURE: 101 MMHG

## 2022-01-19 DIAGNOSIS — I10: ICD-10-CM

## 2022-01-19 DIAGNOSIS — Z88.7: ICD-10-CM

## 2022-01-19 DIAGNOSIS — F17.200: ICD-10-CM

## 2022-01-19 DIAGNOSIS — Z88.2: ICD-10-CM

## 2022-01-19 DIAGNOSIS — E78.00: ICD-10-CM

## 2022-01-19 DIAGNOSIS — R19.7: Primary | ICD-10-CM

## 2022-01-19 LAB
ALBUMIN SERPL-MCNC: 3.8 G/DL (ref 3.4–5)
ALBUMIN/GLOB SERPL: 1 {RATIO} (ref 1–1.7)
ALP SERPL-CCNC: 85 U/L (ref 46–116)
ALT SERPL-CCNC: 60 U/L (ref 16–63)
ANION GAP SERPL CALC-SCNC: 10 MMOL/L (ref 6–14)
APTT PPP: YELLOW S
AST SERPL-CCNC: 24 U/L (ref 15–37)
BACTERIA #/AREA URNS HPF: 0 /HPF
BASOPHILS # BLD AUTO: 0 X10^3/UL (ref 0–0.2)
BASOPHILS NFR BLD: 0 % (ref 0–3)
BILIRUB SERPL-MCNC: 0.4 MG/DL (ref 0.2–1)
BILIRUB UR QL STRIP: NEGATIVE
BUN SERPL-MCNC: 10 MG/DL (ref 8–26)
BUN/CREAT SERPL: 9 (ref 6–20)
CALCIUM SERPL-MCNC: 9.2 MG/DL (ref 8.5–10.1)
CHLORIDE SERPL-SCNC: 98 MMOL/L (ref 98–107)
CO2 SERPL-SCNC: 26 MMOL/L (ref 21–32)
CREAT SERPL-MCNC: 1.1 MG/DL (ref 0.7–1.3)
EOSINOPHIL NFR BLD: 0.3 X10^3/UL (ref 0–0.7)
EOSINOPHIL NFR BLD: 2 % (ref 0–3)
ERYTHROCYTE [DISTWIDTH] IN BLOOD BY AUTOMATED COUNT: 13 % (ref 11.5–14.5)
FIBRINOGEN PPP-MCNC: CLEAR MG/DL
GFR SERPLBLD BASED ON 1.73 SQ M-ARVRAT: 71.4 ML/MIN
GLUCOSE SERPL-MCNC: 135 MG/DL (ref 70–99)
HCT VFR BLD CALC: 43.9 % (ref 39–53)
HGB BLD-MCNC: 15.4 G/DL (ref 13–17.5)
LYMPHOCYTES # BLD: 2.1 X10^3/UL (ref 1–4.8)
LYMPHOCYTES NFR BLD AUTO: 16 % (ref 24–48)
MCH RBC QN AUTO: 33 PG (ref 25–35)
MCHC RBC AUTO-ENTMCNC: 35 G/DL (ref 31–37)
MCV RBC AUTO: 95 FL (ref 79–100)
MONO #: 1.1 X10^3/UL (ref 0–1.1)
MONOCYTES NFR BLD: 8 % (ref 0–9)
NEUT #: 9.9 X10^3/UL (ref 1.8–7.7)
NEUTROPHILS NFR BLD AUTO: 74 % (ref 31–73)
NITRITE UR QL STRIP: NEGATIVE
PH UR STRIP: 6 [PH]
PLATELET # BLD AUTO: 230 X10^3/UL (ref 140–400)
POTASSIUM SERPL-SCNC: 4 MMOL/L (ref 3.5–5.1)
PROT SERPL-MCNC: 7.5 G/DL (ref 6.4–8.2)
PROT UR STRIP-MCNC: NEGATIVE MG/DL
RBC # BLD AUTO: 4.61 X10^6/UL (ref 4.3–5.7)
RBC #/AREA URNS HPF: 0 /HPF (ref 0–2)
SODIUM SERPL-SCNC: 134 MMOL/L (ref 136–145)
UROBILINOGEN UR-MCNC: 0.2 MG/DL
WBC # BLD AUTO: 13.4 X10^3/UL (ref 4–11)
WBC #/AREA URNS HPF: (no result) /HPF (ref 0–4)

## 2022-01-19 PROCEDURE — 99285 EMERGENCY DEPT VISIT HI MDM: CPT

## 2022-01-19 PROCEDURE — 74177 CT ABD & PELVIS W/CONTRAST: CPT

## 2022-01-19 PROCEDURE — 36415 COLL VENOUS BLD VENIPUNCTURE: CPT

## 2022-01-19 PROCEDURE — 80053 COMPREHEN METABOLIC PANEL: CPT

## 2022-01-19 PROCEDURE — 85025 COMPLETE CBC W/AUTO DIFF WBC: CPT

## 2022-01-19 PROCEDURE — 81001 URINALYSIS AUTO W/SCOPE: CPT

## 2022-01-19 PROCEDURE — 96360 HYDRATION IV INFUSION INIT: CPT

## 2022-01-19 NOTE — ED.ADGEN
Past Medical History


Past Medical History:  High Cholesterol, Hypertension


Past Surgical History:  No Surgical History


Smoking Status:  Current Every Day Smoker


Alcohol Use:  Occasionally


Drug Use:  None





General Adult


EDM:


Chief Complaint:  GI PROBLEM





HPI:


HPI:





Patient is a 46-year-old male who arrives ambulatory to the emergency department

complaining of the acute onset of lower abdominal pain.  Patient states at 

roughly 230 this morning the patient began experiencing lower abdominal pain 

which she described as sharp and abrupt.  Patient reports she had loose stool 

during this time as well which had bright red blood.  Patient also reports 

slight nausea during this time as well.  Patient points to the abdomen in the 

lower region near his bladder.  He states when the pain happens it is very sharp

however right now he has just a dull ache.  He denies radiation of this pain.  

He further denies any vomiting or fevers.  Additionally he denies any dysuria or

previous events that have been consistent with this episode.  He is awake, alert

and nontoxic.





Review of Systems:


Review of Systems:


Constitutional:   Denies fever or chills. []


Eyes:   Denies change in visual acuity. []


HENT:   Denies nasal congestion or sore throat. [] 


Respiratory:   Denies cough or shortness of breath. [] 


Cardiovascular:   Denies chest pain or edema. [] 


GI:   Reports nausea, abdominal pain with bloody diarrhea.  Denies vomiting. [] 


:  Denies dysuria. [] 


Musculoskeletal:   Denies back pain or joint pain. [] 


Integument:   Denies rash. [] 


Neurologic:   Denies headache, focal weakness or sensory changes. [] 


Endocrine:   Denies polyuria or polydipsia. [] 


Lymphatic:  Denies swollen glands. [] 


Psychiatric:  Denies depression or anxiety. []





Current Medications:





Current Medications








 Medications


  (Trade)  Dose


 Ordered  Sig/Tasha  Start Time


 Stop Time Status Last Admin


Dose Admin


 


 Iohexol


  (Omnipaque 300


 Mg/ml)  75 ml  1X  ONCE  22 08:15


 22 08:17 DC 22 08:28


75 ML


 


 Sodium Chloride  1,000 ml @ 


 1,000 mls/hr  Q1H  22 07:30


 22 08:29 DC 22 07:30


1,000 MLS/HR











Allergies:


Allergies:





Allergies








Coded Allergies Type Severity Reaction Last Updated Verified


 


  Sulfa (Sulfonamide Antibiotics) Adverse Reaction Severe "my kidneys shut down"

 22 Yes


 


  Tetanus Vaccines and Toxoid Adverse Reaction Intermediate fever 22 Yes


 


Uncoded Allergies Type Severity Reaction Last Updated Verified


 


  ERYTHROMYCIN Allergy Mild  22 











Physical Exam:


PE:





Constitutional: Well developed, well nourished, no acute distress, non-toxic 

appearance. []


HENT: Normocephalic, atraumatic, bilateral external ears normal, oropharynx 

moist, no oral exudates, nose normal. []


Eyes: PERRLA, EOMI, conjunctiva normal, no discharge. [] 


Neck: Normal range of motion, no tenderness, supple, no stridor. [] 


Cardiovascular: Tachycardia.no murmur []


Lungs & Thorax:  Bilateral breath sounds clear to auscultation []


Abdomen: Bowel sounds normal, soft, no tenderness, no masses, no pulsatile 

masses. [] 


Skin: Warm, dry, no erythema, no rash. [] 


Back: No tenderness, no CVA tenderness. [] 


Extremities: No tenderness, no cyanosis, no clubbing, ROM intact, no edema. [] 


Neurologic: Alert and oriented X 3, normal motor function, normal sensory 

function, no focal deficits noted. []


Psychologic: Affect normal, judgement normal, mood normal. []





Current Patient Data:


Labs:





                                Laboratory Tests








Test


 22


07:42 22


07:49


 


Urine Collection Type Unknown   


 


Urine Color Yellow   


 


Urine Clarity Clear   


 


Urine pH


 6.0 (<5.0-8.0)


 





 


Urine Specific Gravity


 1.015


(1.000-1.030) 





 


Urine Protein


 Negative mg/dL


(NEG-TRACE) 





 


Urine Glucose (UA)


 Negative mg/dL


(NEG) 





 


Urine Ketones (Stick)


 Negative mg/dL


(NEG) 





 


Urine Blood


 Negative (NEG)


 





 


Urine Nitrite


 Negative (NEG)


 





 


Urine Bilirubin


 Negative (NEG)


 





 


Urine Urobilinogen Dipstick


 0.2 mg/dL (0.2


mg/dL) 





 


Urine Leukocyte Esterase


 Negative (NEG)


 





 


Urine RBC 0 /HPF (0-2)   


 


Urine WBC


 Occ /HPF (0-4)


 





 


Urine Squamous Epithelial


Cells Occ /LPF  


 





 


Urine Bacteria


 0 /HPF (0-FEW)


 





 


Urine Mucus Slight /LPF   


 


White Blood Count


 


 13.4 x10^3/uL


(4.0-11.0)  H


 


Red Blood Count


 


 4.61 x10^6/uL


(4.30-5.70)


 


Hemoglobin


 


 15.4 g/dL


(13.0-17.5)


 


Hematocrit


 


 43.9 %


(39.0-53.0)


 


Mean Corpuscular Volume


 


 95 fL ()





 


Mean Corpuscular Hemoglobin  33 pg (25-35)  


 


Mean Corpuscular Hemoglobin


Concent 


 35 g/dL


(31-37)


 


Red Cell Distribution Width


 


 13.0 %


(11.5-14.5)


 


Platelet Count


 


 230 x10^3/uL


(140-400)


 


Neutrophils (%) (Auto)  74 % (31-73)  H


 


Lymphocytes (%) (Auto)  16 % (24-48)  L


 


Monocytes (%) (Auto)  8 % (0-9)  


 


Eosinophils (%) (Auto)  2 % (0-3)  


 


Basophils (%) (Auto)  0 % (0-3)  


 


Neutrophils # (Auto)


 


 9.9 x10^3/uL


(1.8-7.7)  H


 


Lymphocytes # (Auto)


 


 2.1 x10^3/uL


(1.0-4.8)


 


Monocytes # (Auto)


 


 1.1 x10^3/uL


(0.0-1.1)


 


Eosinophils # (Auto)


 


 0.3 x10^3/uL


(0.0-0.7)


 


Basophils # (Auto)


 


 0.0 x10^3/uL


(0.0-0.2)


 


Sodium Level


 


 134 mmol/L


(136-145)  L


 


Potassium Level


 


 4.0 mmol/L


(3.5-5.1)


 


Chloride Level


 


 98 mmol/L


()


 


Carbon Dioxide Level


 


 26 mmol/L


(21-32)


 


Anion Gap  10 (6-14)  


 


Blood Urea Nitrogen


 


 10 mg/dL


(8-26)


 


Creatinine


 


 1.1 mg/dL


(0.7-1.3)


 


Estimated GFR


(Cockcroft-Gault) 


 71.4  





 


BUN/Creatinine Ratio  9 (6-20)  


 


Glucose Level


 


 135 mg/dL


(70-99)  H


 


Calcium Level


 


 9.2 mg/dL


(8.5-10.1)


 


Total Bilirubin


 


 0.4 mg/dL


(0.2-1.0)


 


Aspartate Amino Transferase


(AST) 


 24 U/L (15-37)





 


Alanine Aminotransferase (ALT)


 


 60 U/L (16-63)





 


Alkaline Phosphatase


 


 85 U/L


()


 


Total Protein


 


 7.5 g/dL


(6.4-8.2)


 


Albumin


 


 3.8 g/dL


(3.4-5.0)


 


Albumin/Globulin Ratio  1.0 (1.0-1.7)  





                                Laboratory Tests


22 07:49








                                Laboratory Tests


22 07:49








Vital Signs:





                                   Vital Signs








  Date Time  Temp Pulse Resp B/P (MAP) Pulse Ox O2 Delivery O2 Flow Rate FiO2


 


22 07:21 98.3 118 14 177/101 (126) 97 Room Air  





 98.3       











EKG:


EKG:


[]





Heart Score:


C/O Chest Pain:  No


Risk Factors:


Risk Factors:  DM, Current or recent (<one month) smoker, HTN, HLP, family 

history of CAD, obesity.


Risk Scores:


Score 0 - 3:  2.5% MACE over next 6 weeks - Discharge Home


Score 4 - 6:  20.3% MACE over next 6 weeks - Admit for Clinical Observation


Score 7 - 10:  72.7% MACE over next 6 weeks - Early Invasive Strategies





Radiology/Procedures:


Radiology/Procedures:


[][facility]


                    [address] [place] [address]


                                 [phone]


                                        


                                 IMAGING REPORT





                                     Signed





[id number]     MRN#: [id number]


: 1975           LOCATION: ER              AGE: 46


SEX: M                    EXAM DT: 22         ACCESSION#: [id number]


STATUS: REG ER            ORD. PHYSICIAN: EMILY REYES DO


REASON: lower pain/bloody stools


PROCEDURE: CT ABD PELV W/ IV CONTRST ONLY





Exam Date:  2022 8:20 AM





CT ABDOMEN+PELVIS W





Indication: Reason: lower pain/bloody stools / Spl. Instructions: omni 300 75ml 

/ History: .





TECHNIQUE:  CT examination of the abdomen and pelvis was performed following the

 administration of nonionic intravenous contrast.  One or more of the following 

dose reduction techniques were utilized:


*Automated exposure control (AEC)


*Adjustment of mA and/or kV according to patient size


*Use of iterative reconstruction technique


*CT scan done according to ALARA, or ALARA/IMAGE GENTLY





FINDINGS: 





The visualized lung bases are clear.  





There is a small liver cyst.  There are calcified granulomas in the spleen.





The liver, gallbladder, spleen, pancreas, adrenal glands and kidneys are 

otherwise normal.





Urinary bladder is normal in appearance.  





There is no bowel obstruction or inflammation.  The appendix is normal.  





Mild atherosclerotic calcifications are seen.  No lymphadenopathy or ascites is 

seen.  





Degenerative changes are seen in the spine.





IMPRESSION: 





No evidence of acute intra-abdominal pathology.  





Electronically signed by: Yara Patrick MD (2022 8:51 AM) [id number]














DICTATED and SIGNED BY:     YARA PATRICK MD


DATE:     22 [id number] 0





Course & Med Decision Making:


Course & Med Decision Making


Pertinent Labs and Imaging studies reviewed. (See chart for details)





The patient remains awake, alert and in no acute distress.  CT imaging does not 

reveal any acute abdominal or pelvic abnormality.  Furthermore the patient's 

blood work is within normal limits as it relates to his hemoglobin and 

hematocrit.  Because the patient is without abdominal pain and has normal lab 

work in addition to a CT that does not demonstrate any acute abnormality, I 

deferred on rectal examination.  I did stress to the patient however that if he 

has any continued bleeding that he really needs to follow-up with his primary 

care physician for GI referral for colonoscopy.  I did tell him that despite 

having an normal CT, he could be at risk for colon cancer as he is a smoker and 

is rapidly approaching and at risk status based upon his age.  The patient 

understands and states he will follow-up with his family physician for further 

evaluation.  Should he develop any worsening pain, fevers, new vomiting or 

intractable diarrhea, I advised that he return to the emergency department.  The

 patient understands and has agreed to do so.  He is nontoxic-appearing and 

stable for discharge.


[]





Dragon Disclaimer:


Dragon Disclaimer:


This electronic medical record was generated, in whole or in part, using a voice

 recognition dictation system.





Departure


Departure


Impression:  


   Primary Impression:  


   Abdominal pain


   Additional Impression:  


   Diarrhea


Disposition:   HOME / SELF CARE / HOMELESS


Condition:  STABLE


Referrals:  


UNKNOWN PCP NAME (PCP)


Patient Instructions:  Abdominal Pain, Bloody Diarrhea





Additional Instructions:  


Follow-up with your primary care physician in the next 1 to 2 days for GI 

referral for colonoscopy.


Scripts


Ondansetron (ONDANSETRON ODT) 4 Mg Tab.rapdis


4 MG PO QID PRN for NAUSEA/VOMITING for 3 Days, #12 TAB


   Prov: EMILY REYES DO         22 


Dicyclomine Hcl (DICYCLOMINE HCL) 10 Mg Capsule


10 MG PO QID for 5 Days, #20 CAP


   Prov: EMILY REYES DO         22





Problem Qualifiers











EMILY REYES DO               2022 07:33

## 2022-01-19 NOTE — RAD
Exam Date:  1/19/2022 8:20 AM



CT ABDOMEN+PELVIS W



Indication: Reason: lower pain/bloody stools / Spl. Instructions: omni 300 75ml / History: .



TECHNIQUE:  CT examination of the abdomen and pelvis was performed following the administration of no
nionic intravenous contrast.  One or more of the following dose reduction techniques were utilized:

*Automated exposure control (AEC)

*Adjustment of mA and/or kV according to patient size

*Use of iterative reconstruction technique

*CT scan done according to ALARA, or ALARA/IMAGE GENTLY



FINDINGS: 



The visualized lung bases are clear.  



There is a small liver cyst.  There are calcified granulomas in the spleen.



The liver, gallbladder, spleen, pancreas, adrenal glands and kidneys are otherwise normal.



Urinary bladder is normal in appearance.  



There is no bowel obstruction or inflammation.  The appendix is normal.  



Mild atherosclerotic calcifications are seen.  No lymphadenopathy or ascites is seen.  



Degenerative changes are seen in the spine.



IMPRESSION: 



No evidence of acute intra-abdominal pathology.  



Electronically signed by: Jef Patrick MD (1/19/2022 8:51 AM) [email or web address]